# Patient Record
Sex: FEMALE | Race: WHITE | URBAN - METROPOLITAN AREA
[De-identification: names, ages, dates, MRNs, and addresses within clinical notes are randomized per-mention and may not be internally consistent; named-entity substitution may affect disease eponyms.]

---

## 2018-11-01 ENCOUNTER — DOCTOR'S OFFICE (OUTPATIENT)
Dept: URBAN - METROPOLITAN AREA CLINIC 136 | Facility: CLINIC | Age: 73
Setting detail: OPHTHALMOLOGY
End: 2018-11-01
Payer: COMMERCIAL

## 2018-11-01 ENCOUNTER — RX ONLY (RX ONLY)
Age: 73
End: 2018-11-01

## 2018-11-01 DIAGNOSIS — H25.12: ICD-10-CM

## 2018-11-01 DIAGNOSIS — Z96.1: ICD-10-CM

## 2018-11-01 DIAGNOSIS — H59.031: ICD-10-CM

## 2018-11-01 DIAGNOSIS — H35.379: ICD-10-CM

## 2018-11-01 PROCEDURE — 92004 COMPRE OPH EXAM NEW PT 1/>: CPT | Performed by: OPHTHALMOLOGY

## 2018-11-01 PROCEDURE — 92134 CPTRZ OPH DX IMG PST SGM RTA: CPT | Performed by: OPHTHALMOLOGY

## 2018-11-01 ASSESSMENT — REFRACTION_MANIFEST
OD_VA2: 20/
OS_VA1: 20/
OD_VA1: 20/
OU_VA: 20/
OD_VA1: 20/
OS_VA2: 20/
OD_VA3: 20/
OS_VA2: 20/
OD_VA3: 20/
OS_VA1: 20/
OU_VA: 20/
OS_VA3: 20/
OS_VA3: 20/
OD_VA2: 20/

## 2018-11-01 ASSESSMENT — VISUAL ACUITY
OD_BCVA: 20/20-2
OS_BCVA: 20/200

## 2018-11-01 ASSESSMENT — REFRACTION_CURRENTRX
OS_OVR_VA: 20/
OD_ADD: +2.25
OD_OVR_VA: 20/
OS_ADD: +2.25
OD_OVR_VA: 20/
OD_SPHERE: +1.75
OD_CYLINDER: -1.50
OD_OVR_VA: 20/
OS_OVR_VA: 20/
OS_OVR_VA: 20/
OS_CYLINDER: -1.75
OS_SPHERE: +2.25
OS_AXIS: 173
OD_AXIS: 152

## 2018-11-01 ASSESSMENT — CONFRONTATIONAL VISUAL FIELD TEST (CVF)
OD_FINDINGS: FULL
OS_FINDINGS: FULL

## 2018-11-01 ASSESSMENT — SUPERFICIAL PUNCTATE KERATITIS (SPK)
OD_SPK: 1+
OS_SPK: 1+

## 2018-11-07 ENCOUNTER — DOCTOR'S OFFICE (OUTPATIENT)
Dept: URBAN - METROPOLITAN AREA CLINIC 136 | Facility: CLINIC | Age: 73
Setting detail: OPHTHALMOLOGY
End: 2018-11-07
Payer: COMMERCIAL

## 2018-11-07 DIAGNOSIS — H59.031: ICD-10-CM

## 2018-11-07 PROCEDURE — 67515 INJECT/TREAT EYE SOCKET: CPT | Performed by: OPHTHALMOLOGY

## 2018-11-07 ASSESSMENT — CONFRONTATIONAL VISUAL FIELD TEST (CVF)
OD_FINDINGS: FULL
OS_FINDINGS: FULL

## 2018-11-15 ENCOUNTER — RX ONLY (RX ONLY)
Age: 73
End: 2018-11-15

## 2018-11-15 ASSESSMENT — REFRACTION_CURRENTRX
OS_ADD: +2.25
OD_OVR_VA: 20/
OD_OVR_VA: 20/
OS_OVR_VA: 20/
OS_SPHERE: +2.25
OS_OVR_VA: 20/
OD_OVR_VA: 20/
OS_OVR_VA: 20/
OD_AXIS: 152
OD_SPHERE: +1.75
OD_CYLINDER: -1.50
OS_AXIS: 173
OD_ADD: +2.25
OS_CYLINDER: -1.75

## 2018-11-15 ASSESSMENT — REFRACTION_MANIFEST
OS_VA1: 20/
OD_VA3: 20/
OS_VA3: 20/
OD_VA1: 20/
OU_VA: 20/
OS_VA2: 20/
OS_VA1: 20/
OD_VA1: 20/
OS_VA3: 20/
OD_VA2: 20/
OU_VA: 20/
OD_VA2: 20/
OS_VA2: 20/
OD_VA3: 20/

## 2018-11-15 ASSESSMENT — VISUAL ACUITY
OD_BCVA: 20/25
OS_BCVA: 20/60-1

## 2018-11-21 ENCOUNTER — DOCTOR'S OFFICE (OUTPATIENT)
Dept: URBAN - METROPOLITAN AREA CLINIC 136 | Facility: CLINIC | Age: 73
Setting detail: OPHTHALMOLOGY
End: 2018-11-21
Payer: COMMERCIAL

## 2018-11-21 DIAGNOSIS — H59.031: ICD-10-CM

## 2018-11-21 PROCEDURE — 92134 CPTRZ OPH DX IMG PST SGM RTA: CPT | Performed by: OPHTHALMOLOGY

## 2018-11-21 PROCEDURE — 92012 INTRM OPH EXAM EST PATIENT: CPT | Performed by: OPHTHALMOLOGY

## 2018-11-21 ASSESSMENT — CONFRONTATIONAL VISUAL FIELD TEST (CVF)
OD_FINDINGS: FULL
OS_FINDINGS: FULL

## 2018-11-21 ASSESSMENT — SUPERFICIAL PUNCTATE KERATITIS (SPK)
OS_SPK: 1+
OD_SPK: 1+

## 2018-11-25 ASSESSMENT — REFRACTION_CURRENTRX
OS_OVR_VA: 20/
OS_SPHERE: +2.25
OS_OVR_VA: 20/
OS_CYLINDER: -1.75
OS_AXIS: 173
OS_OVR_VA: 20/
OS_ADD: +2.25
OD_OVR_VA: 20/
OD_ADD: +2.25
OD_AXIS: 152
OD_CYLINDER: -1.50
OD_SPHERE: +1.75
OD_OVR_VA: 20/
OD_OVR_VA: 20/

## 2018-11-25 ASSESSMENT — REFRACTION_MANIFEST
OS_VA3: 20/
OD_VA2: 20/
OU_VA: 20/
OD_VA1: 20/
OD_VA3: 20/
OS_VA2: 20/
OU_VA: 20/
OD_VA1: 20/
OD_VA2: 20/
OS_VA2: 20/
OS_VA3: 20/
OS_VA1: 20/
OD_VA3: 20/
OS_VA1: 20/

## 2018-11-25 ASSESSMENT — VISUAL ACUITY
OS_BCVA: 20/60+1
OD_BCVA: 20/25

## 2018-12-11 ENCOUNTER — RX ONLY (RX ONLY)
Age: 73
End: 2018-12-11

## 2018-12-11 ENCOUNTER — DOCTOR'S OFFICE (OUTPATIENT)
Dept: URBAN - METROPOLITAN AREA CLINIC 136 | Facility: CLINIC | Age: 73
Setting detail: OPHTHALMOLOGY
End: 2018-12-11
Payer: COMMERCIAL

## 2018-12-11 DIAGNOSIS — H25.12: ICD-10-CM

## 2018-12-11 DIAGNOSIS — H59.031: ICD-10-CM

## 2018-12-11 DIAGNOSIS — H35.379: ICD-10-CM

## 2018-12-11 DIAGNOSIS — Z96.1: ICD-10-CM

## 2018-12-11 PROCEDURE — 67028 INJECTION EYE DRUG: CPT | Performed by: OPHTHALMOLOGY

## 2018-12-11 PROCEDURE — 92134 CPTRZ OPH DX IMG PST SGM RTA: CPT | Performed by: OPHTHALMOLOGY

## 2018-12-11 PROCEDURE — 92014 COMPRE OPH EXAM EST PT 1/>: CPT | Performed by: OPHTHALMOLOGY

## 2018-12-11 ASSESSMENT — REFRACTION_CURRENTRX
OS_OVR_VA: 20/
OS_ADD: +2.25
OS_OVR_VA: 20/
OD_OVR_VA: 20/
OD_AXIS: 152
OS_OVR_VA: 20/
OD_OVR_VA: 20/
OD_OVR_VA: 20/
OS_SPHERE: +2.25
OD_ADD: +2.25
OD_CYLINDER: -1.50
OD_SPHERE: +1.75
OS_AXIS: 173
OS_CYLINDER: -1.75

## 2018-12-11 ASSESSMENT — SUPERFICIAL PUNCTATE KERATITIS (SPK)
OS_SPK: 1+
OD_SPK: 1+

## 2018-12-11 ASSESSMENT — REFRACTION_MANIFEST
OU_VA: 20/
OS_VA3: 20/
OS_VA1: 20/
OS_VA1: 20/
OS_VA2: 20/
OU_VA: 20/
OS_VA2: 20/
OD_VA1: 20/
OD_VA3: 20/
OS_VA3: 20/
OD_VA3: 20/
OD_VA2: 20/
OD_VA2: 20/
OD_VA1: 20/

## 2018-12-11 ASSESSMENT — VISUAL ACUITY
OS_BCVA: 20/60-2
OD_BCVA: 20/40-

## 2018-12-11 ASSESSMENT — CONFRONTATIONAL VISUAL FIELD TEST (CVF)
OS_FINDINGS: FULL
OD_FINDINGS: FULL

## 2019-01-22 ENCOUNTER — DOCTOR'S OFFICE (OUTPATIENT)
Dept: URBAN - METROPOLITAN AREA CLINIC 136 | Facility: CLINIC | Age: 74
Setting detail: OPHTHALMOLOGY
End: 2019-01-22
Payer: COMMERCIAL

## 2019-01-22 DIAGNOSIS — H59.031: ICD-10-CM

## 2019-01-22 DIAGNOSIS — H25.12: ICD-10-CM

## 2019-01-22 DIAGNOSIS — H35.373: ICD-10-CM

## 2019-01-22 DIAGNOSIS — Z96.1: ICD-10-CM

## 2019-01-22 PROCEDURE — 92134 CPTRZ OPH DX IMG PST SGM RTA: CPT | Performed by: OPHTHALMOLOGY

## 2019-01-22 PROCEDURE — 92012 INTRM OPH EXAM EST PATIENT: CPT | Performed by: OPHTHALMOLOGY

## 2019-01-22 ASSESSMENT — CONFRONTATIONAL VISUAL FIELD TEST (CVF)
OD_FINDINGS: FULL
OS_FINDINGS: FULL

## 2019-01-22 ASSESSMENT — SUPERFICIAL PUNCTATE KERATITIS (SPK)
OD_SPK: 1+ 2+
OS_SPK: 1+

## 2019-01-23 ASSESSMENT — REFRACTION_CURRENTRX
OS_OVR_VA: 20/
OS_SPHERE: +2.25
OD_AXIS: 152
OD_OVR_VA: 20/
OS_ADD: +2.25
OS_CYLINDER: -1.75
OD_OVR_VA: 20/
OD_CYLINDER: -1.50
OS_OVR_VA: 20/
OD_SPHERE: +1.75
OS_AXIS: 173
OS_OVR_VA: 20/
OD_OVR_VA: 20/
OD_ADD: +2.25

## 2019-01-23 ASSESSMENT — REFRACTION_MANIFEST
OD_VA1: 20/
OD_VA3: 20/
OD_VA2: 20/
OU_VA: 20/
OD_VA3: 20/
OD_VA1: 20/
OS_VA3: 20/
OS_VA1: 20/
OS_VA3: 20/
OS_VA1: 20/
OS_VA2: 20/
OS_VA2: 20/
OU_VA: 20/
OD_VA2: 20/

## 2019-01-23 ASSESSMENT — VISUAL ACUITY
OD_BCVA: 20/30+2
OS_BCVA: 20/50+1

## 2019-03-04 ENCOUNTER — DOCTOR'S OFFICE (OUTPATIENT)
Dept: URBAN - METROPOLITAN AREA CLINIC 136 | Facility: CLINIC | Age: 74
Setting detail: OPHTHALMOLOGY
End: 2019-03-04
Payer: COMMERCIAL

## 2019-03-04 DIAGNOSIS — H35.379: ICD-10-CM

## 2019-03-04 DIAGNOSIS — H59.031: ICD-10-CM

## 2019-03-04 DIAGNOSIS — H25.12: ICD-10-CM

## 2019-03-04 DIAGNOSIS — Z96.1: ICD-10-CM

## 2019-03-04 PROCEDURE — 92134 CPTRZ OPH DX IMG PST SGM RTA: CPT | Performed by: OPHTHALMOLOGY

## 2019-03-04 PROCEDURE — 92014 COMPRE OPH EXAM EST PT 1/>: CPT | Performed by: OPHTHALMOLOGY

## 2019-03-04 ASSESSMENT — REFRACTION_MANIFEST
OD_VA2: 20/
OS_VA1: 20/
OS_VA2: 20/
OD_VA2: 20/
OD_VA1: 20/
OD_VA3: 20/
OS_VA2: 20/
OS_VA1: 20/
OS_VA3: 20/
OS_VA3: 20/
OU_VA: 20/
OD_VA3: 20/
OU_VA: 20/
OD_VA1: 20/

## 2019-03-04 ASSESSMENT — REFRACTION_CURRENTRX
OS_OVR_VA: 20/
OS_OVR_VA: 20/
OD_CYLINDER: -1.50
OD_ADD: +2.25
OS_CYLINDER: -1.75
OD_OVR_VA: 20/
OS_ADD: +2.25
OD_OVR_VA: 20/
OD_SPHERE: +1.75
OS_AXIS: 173
OD_AXIS: 152
OS_SPHERE: +2.25
OD_OVR_VA: 20/
OS_OVR_VA: 20/

## 2019-03-04 ASSESSMENT — VISUAL ACUITY
OS_BCVA: 20/50-2
OD_BCVA: 20/30-2

## 2019-03-04 ASSESSMENT — SUPERFICIAL PUNCTATE KERATITIS (SPK)
OS_SPK: 1+
OD_SPK: 1+ 2+

## 2019-03-04 ASSESSMENT — CONFRONTATIONAL VISUAL FIELD TEST (CVF)
OS_FINDINGS: FULL
OD_FINDINGS: FULL

## 2019-04-02 ENCOUNTER — DOCTOR'S OFFICE (OUTPATIENT)
Dept: URBAN - METROPOLITAN AREA CLINIC 136 | Facility: CLINIC | Age: 74
Setting detail: OPHTHALMOLOGY
End: 2019-04-02
Payer: COMMERCIAL

## 2019-04-02 DIAGNOSIS — Z96.1: ICD-10-CM

## 2019-04-02 DIAGNOSIS — H59.031: ICD-10-CM

## 2019-04-02 DIAGNOSIS — H26.40: ICD-10-CM

## 2019-04-02 DIAGNOSIS — H35.379: ICD-10-CM

## 2019-04-02 DIAGNOSIS — H25.012: ICD-10-CM

## 2019-04-02 DIAGNOSIS — H04.122: ICD-10-CM

## 2019-04-02 DIAGNOSIS — H04.121: ICD-10-CM

## 2019-04-02 PROCEDURE — 92014 COMPRE OPH EXAM EST PT 1/>: CPT | Performed by: OPHTHALMOLOGY

## 2019-04-02 ASSESSMENT — REFRACTION_MANIFEST
OS_VA1: 20/
OD_VA1: 20/
OD_VA2: 20/
OS_VA1: 20/
OU_VA: 20/
OS_VA3: 20/
OD_VA3: 20/
OS_VA3: 20/
OD_VA3: 20/
OS_VA2: 20/
OD_VA1: 20/
OU_VA: 20/
OD_VA2: 20/
OS_VA2: 20/

## 2019-04-02 ASSESSMENT — REFRACTION_CURRENTRX
OS_ADD: +2.25
OS_OVR_VA: 20/
OS_OVR_VA: 20/
OD_AXIS: 152
OS_CYLINDER: -1.75
OS_AXIS: 173
OS_OVR_VA: 20/
OD_CYLINDER: -1.50
OD_OVR_VA: 20/
OD_ADD: +2.25
OD_OVR_VA: 20/
OS_SPHERE: +2.25
OD_SPHERE: +1.75
OD_OVR_VA: 20/

## 2019-04-02 ASSESSMENT — CONFRONTATIONAL VISUAL FIELD TEST (CVF)
OS_FINDINGS: FULL
OD_FINDINGS: FULL

## 2019-04-02 ASSESSMENT — SPHEQUIV_DERIVED: OD_SPHEQUIV: 1

## 2019-04-02 ASSESSMENT — REFRACTION_AUTOREFRACTION
OD_CYLINDER: -1.00
OD_SPHERE: +1.50
OD_AXIS: 110

## 2019-04-02 ASSESSMENT — VISUAL ACUITY
OD_BCVA: 20/30-2
OS_BCVA: 20/50-2

## 2019-04-02 ASSESSMENT — SUPERFICIAL PUNCTATE KERATITIS (SPK): OD_SPK: 1+

## 2019-04-09 ENCOUNTER — DOCTOR'S OFFICE (OUTPATIENT)
Dept: URBAN - METROPOLITAN AREA CLINIC 136 | Facility: CLINIC | Age: 74
Setting detail: OPHTHALMOLOGY
End: 2019-04-09
Payer: COMMERCIAL

## 2019-04-09 DIAGNOSIS — Z96.1: ICD-10-CM

## 2019-04-09 DIAGNOSIS — H59.031: ICD-10-CM

## 2019-04-09 DIAGNOSIS — H04.122: ICD-10-CM

## 2019-04-09 DIAGNOSIS — H04.121: ICD-10-CM

## 2019-04-09 DIAGNOSIS — H26.40: ICD-10-CM

## 2019-04-09 DIAGNOSIS — H35.379: ICD-10-CM

## 2019-04-09 DIAGNOSIS — H25.012: ICD-10-CM

## 2019-04-09 PROCEDURE — 92014 COMPRE OPH EXAM EST PT 1/>: CPT | Performed by: OPHTHALMOLOGY

## 2019-04-09 PROCEDURE — 92134 CPTRZ OPH DX IMG PST SGM RTA: CPT | Performed by: OPHTHALMOLOGY

## 2019-04-09 ASSESSMENT — REFRACTION_MANIFEST
OS_VA3: 20/
OS_VA2: 20/
OD_VA1: 20/
OU_VA: 20/
OD_VA2: 20/
OS_VA1: 20/
OS_VA3: 20/
OS_VA1: 20/
OD_VA2: 20/
OD_VA3: 20/
OS_VA2: 20/
OD_VA3: 20/
OU_VA: 20/
OD_VA1: 20/

## 2019-04-09 ASSESSMENT — REFRACTION_CURRENTRX
OS_OVR_VA: 20/
OS_ADD: +2.25
OS_SPHERE: +2.25
OD_OVR_VA: 20/
OD_ADD: +2.25
OD_CYLINDER: -1.50
OS_OVR_VA: 20/
OD_SPHERE: +1.75
OD_OVR_VA: 20/
OS_AXIS: 173
OD_OVR_VA: 20/
OS_OVR_VA: 20/
OS_CYLINDER: -1.75
OD_AXIS: 152

## 2019-04-09 ASSESSMENT — SPHEQUIV_DERIVED: OD_SPHEQUIV: 1

## 2019-04-09 ASSESSMENT — VISUAL ACUITY
OS_BCVA: 20/60-2
OD_BCVA: 20/30-2

## 2019-04-09 ASSESSMENT — SUPERFICIAL PUNCTATE KERATITIS (SPK): OD_SPK: 1+

## 2019-04-09 ASSESSMENT — CONFRONTATIONAL VISUAL FIELD TEST (CVF)
OS_FINDINGS: FULL
OD_FINDINGS: FULL

## 2019-04-09 ASSESSMENT — REFRACTION_AUTOREFRACTION
OD_CYLINDER: -1.00
OD_AXIS: 110
OD_SPHERE: +1.50

## 2019-05-01 ENCOUNTER — DOCTOR'S OFFICE (OUTPATIENT)
Dept: URBAN - METROPOLITAN AREA CLINIC 136 | Facility: CLINIC | Age: 74
Setting detail: OPHTHALMOLOGY
End: 2019-05-01
Payer: COMMERCIAL

## 2019-05-01 ENCOUNTER — RX ONLY (RX ONLY)
Age: 74
End: 2019-05-01

## 2019-05-01 DIAGNOSIS — H35.351: ICD-10-CM

## 2019-05-01 PROCEDURE — 92134 CPTRZ OPH DX IMG PST SGM RTA: CPT | Performed by: OPHTHALMOLOGY

## 2019-05-01 PROCEDURE — 67028 INJECTION EYE DRUG: CPT | Performed by: OPHTHALMOLOGY

## 2019-05-01 ASSESSMENT — REFRACTION_CURRENTRX
OD_AXIS: 152
OD_OVR_VA: 20/
OS_OVR_VA: 20/
OS_OVR_VA: 20/
OS_AXIS: 173
OS_OVR_VA: 20/
OD_SPHERE: +1.75
OD_OVR_VA: 20/
OD_ADD: +2.25
OS_SPHERE: +2.25
OS_ADD: +2.25
OD_OVR_VA: 20/
OD_CYLINDER: -1.50
OS_CYLINDER: -1.75

## 2019-05-01 ASSESSMENT — REFRACTION_MANIFEST
OD_VA3: 20/
OD_VA1: 20/
OU_VA: 20/
OS_VA3: 20/
OD_VA3: 20/
OD_VA2: 20/
OU_VA: 20/
OS_VA1: 20/
OD_VA2: 20/
OS_VA3: 20/
OD_VA1: 20/
OS_VA2: 20/
OS_VA2: 20/
OS_VA1: 20/

## 2019-05-01 ASSESSMENT — REFRACTION_AUTOREFRACTION
OD_CYLINDER: -1.00
OD_SPHERE: +1.50
OD_AXIS: 110

## 2019-05-01 ASSESSMENT — CONFRONTATIONAL VISUAL FIELD TEST (CVF)
OD_FINDINGS: FULL
OS_FINDINGS: FULL

## 2019-05-01 ASSESSMENT — VISUAL ACUITY
OS_BCVA: 20/60-2
OD_BCVA: 20/30-2

## 2019-05-01 ASSESSMENT — SPHEQUIV_DERIVED: OD_SPHEQUIV: 1

## 2019-05-29 ENCOUNTER — DOCTOR'S OFFICE (OUTPATIENT)
Dept: URBAN - METROPOLITAN AREA CLINIC 136 | Facility: CLINIC | Age: 74
Setting detail: OPHTHALMOLOGY
End: 2019-05-29
Payer: COMMERCIAL

## 2019-05-29 ENCOUNTER — RX ONLY (RX ONLY)
Age: 74
End: 2019-05-29

## 2019-05-29 DIAGNOSIS — H35.351: ICD-10-CM

## 2019-05-29 DIAGNOSIS — H25.012: ICD-10-CM

## 2019-05-29 PROCEDURE — 92014 COMPRE OPH EXAM EST PT 1/>: CPT | Performed by: OPHTHALMOLOGY

## 2019-05-29 PROCEDURE — 92134 CPTRZ OPH DX IMG PST SGM RTA: CPT | Performed by: OPHTHALMOLOGY

## 2019-05-29 ASSESSMENT — REFRACTION_MANIFEST
OU_VA: 20/
OS_VA1: 20/
OD_VA1: 20/
OD_VA2: 20/
OS_VA1: 20/
OU_VA: 20/
OS_VA2: 20/
OS_VA3: 20/
OD_VA3: 20/
OD_VA1: 20/
OD_VA2: 20/
OS_VA3: 20/
OD_VA3: 20/
OS_VA2: 20/

## 2019-05-29 ASSESSMENT — REFRACTION_CURRENTRX
OS_CYLINDER: -1.75
OS_OVR_VA: 20/
OD_CYLINDER: -1.50
OS_SPHERE: +2.25
OD_OVR_VA: 20/
OD_AXIS: 152
OS_AXIS: 173
OD_ADD: +2.25
OS_OVR_VA: 20/
OD_OVR_VA: 20/
OD_OVR_VA: 20/
OD_SPHERE: +1.75
OS_OVR_VA: 20/
OS_ADD: +2.25

## 2019-05-29 ASSESSMENT — CONFRONTATIONAL VISUAL FIELD TEST (CVF)
OD_FINDINGS: FULL
OS_FINDINGS: FULL

## 2019-05-29 ASSESSMENT — SUPERFICIAL PUNCTATE KERATITIS (SPK)
OS_SPK: 2+
OD_SPK: 1+

## 2019-05-29 ASSESSMENT — VISUAL ACUITY
OD_BCVA: 20/60-1
OS_BCVA: 20/50-1

## 2019-05-29 ASSESSMENT — REFRACTION_AUTOREFRACTION
OD_CYLINDER: -1.00
OD_SPHERE: +1.50
OD_AXIS: 110

## 2019-05-29 ASSESSMENT — SPHEQUIV_DERIVED: OD_SPHEQUIV: 1

## 2019-06-11 ENCOUNTER — DOCTOR'S OFFICE (OUTPATIENT)
Dept: URBAN - METROPOLITAN AREA CLINIC 136 | Facility: CLINIC | Age: 74
Setting detail: OPHTHALMOLOGY
End: 2019-06-11
Payer: COMMERCIAL

## 2019-06-11 DIAGNOSIS — H35.379: ICD-10-CM

## 2019-06-11 DIAGNOSIS — H25.012: ICD-10-CM

## 2019-06-11 DIAGNOSIS — H35.351: ICD-10-CM

## 2019-06-11 PROCEDURE — 92014 COMPRE OPH EXAM EST PT 1/>: CPT | Performed by: OPHTHALMOLOGY

## 2019-06-11 ASSESSMENT — REFRACTION_MANIFEST
OD_VA1: 20/
OD_VA3: 20/
OD_VA1: 20/
OU_VA: 20/
OS_VA3: 20/
OS_VA3: 20/
OD_VA2: 20/
OS_VA2: 20/
OD_VA2: 20/
OS_VA1: 20/
OS_VA2: 20/
OS_VA1: 20/
OD_VA3: 20/
OU_VA: 20/

## 2019-06-11 ASSESSMENT — REFRACTION_CURRENTRX
OD_CYLINDER: -1.50
OD_AXIS: 152
OS_CYLINDER: -1.75
OS_OVR_VA: 20/
OS_AXIS: 173
OD_OVR_VA: 20/
OD_OVR_VA: 20/
OS_SPHERE: +2.25
OD_OVR_VA: 20/
OS_OVR_VA: 20/
OS_ADD: +2.25
OD_ADD: +2.25
OD_SPHERE: +1.75
OS_OVR_VA: 20/

## 2019-06-11 ASSESSMENT — REFRACTION_AUTOREFRACTION
OD_CYLINDER: -1.00
OD_SPHERE: +1.50
OD_AXIS: 110

## 2019-06-11 ASSESSMENT — VISUAL ACUITY
OS_BCVA: 20/30-2
OD_BCVA: 20/40-1

## 2019-06-11 ASSESSMENT — CONFRONTATIONAL VISUAL FIELD TEST (CVF)
OS_FINDINGS: FULL
OD_FINDINGS: FULL

## 2019-06-11 ASSESSMENT — SUPERFICIAL PUNCTATE KERATITIS (SPK)
OS_SPK: 2+
OD_SPK: 1+

## 2019-06-11 ASSESSMENT — SPHEQUIV_DERIVED: OD_SPHEQUIV: 1

## 2019-06-21 ENCOUNTER — DOCTOR'S OFFICE (OUTPATIENT)
Dept: URBAN - METROPOLITAN AREA CLINIC 136 | Facility: CLINIC | Age: 74
Setting detail: OPHTHALMOLOGY
End: 2019-06-21
Payer: COMMERCIAL

## 2019-06-21 DIAGNOSIS — H25.012: ICD-10-CM

## 2019-06-21 PROCEDURE — 92136 OPHTHALMIC BIOMETRY: CPT | Performed by: OPHTHALMOLOGY

## 2019-07-24 ENCOUNTER — RX ONLY (RX ONLY)
Age: 74
End: 2019-07-24

## 2019-07-24 ENCOUNTER — DOCTOR'S OFFICE (OUTPATIENT)
Dept: URBAN - METROPOLITAN AREA CLINIC 136 | Facility: CLINIC | Age: 74
Setting detail: OPHTHALMOLOGY
End: 2019-07-24
Payer: COMMERCIAL

## 2019-07-24 DIAGNOSIS — H25.012: ICD-10-CM

## 2019-07-24 DIAGNOSIS — H35.351: ICD-10-CM

## 2019-07-24 DIAGNOSIS — H35.379: ICD-10-CM

## 2019-07-24 PROCEDURE — 92134 CPTRZ OPH DX IMG PST SGM RTA: CPT | Performed by: OPHTHALMOLOGY

## 2019-07-24 PROCEDURE — 92014 COMPRE OPH EXAM EST PT 1/>: CPT | Performed by: OPHTHALMOLOGY

## 2019-07-24 ASSESSMENT — REFRACTION_CURRENTRX
OS_OVR_VA: 20/
OS_OVR_VA: 20/
OD_OVR_VA: 20/
OD_OVR_VA: 20/
OS_SPHERE: +2.25
OS_ADD: +2.25
OD_SPHERE: +1.75
OD_OVR_VA: 20/
OD_ADD: +2.25
OS_OVR_VA: 20/
OD_CYLINDER: -1.50
OS_CYLINDER: -1.75
OS_AXIS: 173
OD_AXIS: 152

## 2019-07-24 ASSESSMENT — VISUAL ACUITY
OS_BCVA: 20/25-1
OD_BCVA: 20/25+2

## 2019-07-24 ASSESSMENT — REFRACTION_MANIFEST
OS_VA2: 20/
OS_VA3: 20/
OD_VA3: 20/
OD_VA1: 20/
OS_VA1: 20/
OS_VA3: 20/
OD_VA2: 20/
OU_VA: 20/
OD_VA3: 20/
OS_VA2: 20/
OU_VA: 20/
OS_VA1: 20/
OD_VA1: 20/
OD_VA2: 20/

## 2019-07-24 ASSESSMENT — SUPERFICIAL PUNCTATE KERATITIS (SPK)
OD_SPK: 1+
OS_SPK: 2+

## 2019-07-24 ASSESSMENT — SPHEQUIV_DERIVED: OD_SPHEQUIV: 1

## 2019-07-24 ASSESSMENT — REFRACTION_AUTOREFRACTION
OD_AXIS: 110
OD_SPHERE: +1.50
OD_CYLINDER: -1.00

## 2019-07-24 ASSESSMENT — CONFRONTATIONAL VISUAL FIELD TEST (CVF)
OD_FINDINGS: FULL
OS_FINDINGS: FULL

## 2019-08-02 ENCOUNTER — AMBUL SURGICAL CARE (OUTPATIENT)
Dept: URBAN - METROPOLITAN AREA SURGERY 32 | Facility: SURGERY | Age: 74
Setting detail: OPHTHALMOLOGY
End: 2019-08-02

## 2019-08-02 DIAGNOSIS — H25.13: ICD-10-CM

## 2019-08-02 PROCEDURE — RESTOR TOR PREMIUM UPGRADE: Performed by: OPHTHALMOLOGY

## 2019-08-13 ENCOUNTER — AMBUL SURGICAL CARE (OUTPATIENT)
Dept: URBAN - METROPOLITAN AREA SURGERY 32 | Facility: SURGERY | Age: 74
Setting detail: OPHTHALMOLOGY
End: 2019-08-13
Payer: COMMERCIAL

## 2019-08-13 DIAGNOSIS — H25.012: ICD-10-CM

## 2019-08-13 DIAGNOSIS — H25.12: ICD-10-CM

## 2019-08-13 PROCEDURE — V2788 PRESBYOPIA-CORRECT FUNCTION: HCPCS | Performed by: OPHTHALMOLOGY

## 2019-08-13 PROCEDURE — G8907 PT DOC NO EVENTS ON DISCHARG: HCPCS | Performed by: OPHTHALMOLOGY

## 2019-08-13 PROCEDURE — 66984 XCAPSL CTRC RMVL W/O ECP: CPT | Performed by: OPHTHALMOLOGY

## 2019-08-13 PROCEDURE — G8918 PT W/O PREOP ORDER IV AB PRO: HCPCS | Performed by: OPHTHALMOLOGY

## 2019-08-14 ENCOUNTER — DOCTOR'S OFFICE (OUTPATIENT)
Dept: URBAN - METROPOLITAN AREA CLINIC 136 | Facility: CLINIC | Age: 74
Setting detail: OPHTHALMOLOGY
End: 2019-08-14

## 2019-08-14 ENCOUNTER — RX ONLY (RX ONLY)
Age: 74
End: 2019-08-14

## 2019-08-14 DIAGNOSIS — Z96.1: ICD-10-CM

## 2019-08-14 PROCEDURE — 99024 POSTOP FOLLOW-UP VISIT: CPT | Performed by: OPTOMETRIST

## 2019-08-14 ASSESSMENT — SPHEQUIV_DERIVED
OD_SPHEQUIV: 1
OS_SPHEQUIV: 1.25

## 2019-08-14 ASSESSMENT — REFRACTION_AUTOREFRACTION
OS_CYLINDER: -1.00
OS_SPHERE: +1.75
OD_AXIS: 110
OS_AXIS: 145
OD_CYLINDER: -1.00
OD_SPHERE: +1.50

## 2019-08-14 ASSESSMENT — REFRACTION_MANIFEST
OU_VA: 20/
OS_VA3: 20/
OS_SPHERE: +1.00
OS_VA3: 20/
OS_VA1: 20/30-2
OD_VA3: 20/
OD_VA1: 20/
OS_VA2: 20/
OS_VA1: 20/
OS_VA2: 20/30-2
OS_CYLINDER: SPH
OD_VA1: 20/
OD_VA3: 20/
OU_VA: 20/
OS_ADD: +1.50
OD_VA2: 20/
OD_VA2: 20/

## 2019-08-14 ASSESSMENT — REFRACTION_CURRENTRX
OS_AXIS: 173
OD_CYLINDER: -1.50
OD_OVR_VA: 20/
OS_SPHERE: +2.25
OS_CYLINDER: -1.75
OS_ADD: +2.25
OS_OVR_VA: 20/
OS_OVR_VA: 20/
OD_ADD: +2.25
OS_OVR_VA: 20/
OD_OVR_VA: 20/
OD_OVR_VA: 20/
OD_AXIS: 152
OD_SPHERE: +1.75

## 2019-08-14 ASSESSMENT — VISUAL ACUITY
OS_BCVA: 20/25-1
OD_BCVA: 20/60-1

## 2019-08-14 ASSESSMENT — SUPERFICIAL PUNCTATE KERATITIS (SPK)
OS_SPK: 2+
OD_SPK: 1+

## 2019-08-27 ENCOUNTER — DOCTOR'S OFFICE (OUTPATIENT)
Dept: URBAN - METROPOLITAN AREA CLINIC 136 | Facility: CLINIC | Age: 74
Setting detail: OPHTHALMOLOGY
End: 2019-08-27

## 2019-08-27 DIAGNOSIS — Z96.1: ICD-10-CM

## 2019-08-27 PROBLEM — H25.012 CATARACT; LEFT EYE: Status: RESOLVED | Noted: 2019-04-02 | Resolved: 2019-08-27

## 2019-08-27 PROCEDURE — 99024 POSTOP FOLLOW-UP VISIT: CPT | Performed by: OPHTHALMOLOGY

## 2019-08-27 ASSESSMENT — REFRACTION_MANIFEST
OS_VA3: 20/
OS_CYLINDER: SPH
OS_VA2: 20/
OS_VA1: 20/
OS_VA3: 20/
OD_VA1: 20/
OU_VA: 20/
OD_VA3: 20/
OD_VA1: 20/
OS_VA2: 20/30-2
OD_VA3: 20/
OD_VA2: 20/
OS_ADD: +1.50
OS_SPHERE: +1.00
OS_VA1: 20/30-2
OU_VA: 20/
OD_VA2: 20/

## 2019-08-27 ASSESSMENT — REFRACTION_CURRENTRX
OS_OVR_VA: 20/
OD_OVR_VA: 20/
OD_OVR_VA: 20/
OD_CYLINDER: -1.50
OS_CYLINDER: -1.75
OD_AXIS: 152
OS_OVR_VA: 20/
OS_OVR_VA: 20/
OD_SPHERE: +1.75
OS_ADD: +2.25
OS_AXIS: 173
OS_SPHERE: +2.25
OD_ADD: +2.25
OD_OVR_VA: 20/

## 2019-08-27 ASSESSMENT — SPHEQUIV_DERIVED
OD_SPHEQUIV: 1
OS_SPHEQUIV: 1

## 2019-08-27 ASSESSMENT — REFRACTION_AUTOREFRACTION
OS_SPHERE: +1.25
OD_CYLINDER: -1.00
OD_SPHERE: +1.50
OD_AXIS: 110
OS_CYLINDER: -0.50
OS_AXIS: 096

## 2019-08-27 ASSESSMENT — SUPERFICIAL PUNCTATE KERATITIS (SPK)
OD_SPK: 1+
OS_SPK: 2+

## 2019-08-27 ASSESSMENT — VISUAL ACUITY
OD_BCVA: 20/40-2
OS_BCVA: 20/25-1

## 2019-09-24 ENCOUNTER — DOCTOR'S OFFICE (OUTPATIENT)
Dept: URBAN - METROPOLITAN AREA CLINIC 136 | Facility: CLINIC | Age: 74
Setting detail: OPHTHALMOLOGY
End: 2019-09-24
Payer: COMMERCIAL

## 2019-09-24 DIAGNOSIS — H59.032: ICD-10-CM

## 2019-09-24 DIAGNOSIS — Z96.1: ICD-10-CM

## 2019-09-24 DIAGNOSIS — H35.81: ICD-10-CM

## 2019-09-24 PROCEDURE — 92014 COMPRE OPH EXAM EST PT 1/>: CPT | Performed by: OPHTHALMOLOGY

## 2019-09-24 PROCEDURE — 92134 CPTRZ OPH DX IMG PST SGM RTA: CPT | Performed by: OPHTHALMOLOGY

## 2019-09-24 ASSESSMENT — REFRACTION_CURRENTRX
OS_OVR_VA: 20/
OD_CYLINDER: -1.50
OS_SPHERE: +2.25
OD_OVR_VA: 20/
OS_OVR_VA: 20/
OD_AXIS: 152
OS_OVR_VA: 20/
OS_CYLINDER: -1.75
OD_OVR_VA: 20/
OS_AXIS: 173
OD_ADD: +2.25
OD_OVR_VA: 20/
OD_SPHERE: +1.75
OS_ADD: +2.25

## 2019-09-24 ASSESSMENT — REFRACTION_MANIFEST
OD_VA1: 20/
OD_VA2: 20/
OD_VA3: 20/
OU_VA: 20/
OD_VA1: 20/
OS_SPHERE: +1.00
OS_CYLINDER: SPH
OS_VA1: 20/30-2
OS_VA3: 20/
OU_VA: 20/
OS_VA3: 20/
OD_VA3: 20/
OS_ADD: +1.50
OD_VA2: 20/
OS_VA1: 20/
OS_VA2: 20/30-2
OS_VA2: 20/

## 2019-09-24 ASSESSMENT — SUPERFICIAL PUNCTATE KERATITIS (SPK)
OS_SPK: 2+
OD_SPK: 1+

## 2019-09-24 ASSESSMENT — REFRACTION_AUTOREFRACTION
OS_SPHERE: +1.25
OS_CYLINDER: -0.50
OD_SPHERE: +1.50
OD_AXIS: 110
OD_CYLINDER: -1.00
OS_AXIS: 096

## 2019-09-24 ASSESSMENT — SPHEQUIV_DERIVED
OD_SPHEQUIV: 1
OS_SPHEQUIV: 1

## 2019-09-24 ASSESSMENT — CONFRONTATIONAL VISUAL FIELD TEST (CVF)
OD_FINDINGS: FULL
OS_FINDINGS: FULL

## 2019-09-24 ASSESSMENT — VISUAL ACUITY
OD_BCVA: 20/50-2
OS_BCVA: 20/25-2

## 2019-11-12 ENCOUNTER — RX ONLY (RX ONLY)
Age: 74
End: 2019-11-12

## 2019-11-12 ENCOUNTER — DOCTOR'S OFFICE (OUTPATIENT)
Dept: URBAN - METROPOLITAN AREA CLINIC 136 | Facility: CLINIC | Age: 74
Setting detail: OPHTHALMOLOGY
End: 2019-11-12
Payer: COMMERCIAL

## 2019-11-12 DIAGNOSIS — H59.032: ICD-10-CM

## 2019-11-12 DIAGNOSIS — Z96.1: ICD-10-CM

## 2019-11-12 PROCEDURE — 92012 INTRM OPH EXAM EST PATIENT: CPT | Performed by: OPHTHALMOLOGY

## 2019-11-12 PROCEDURE — 92134 CPTRZ OPH DX IMG PST SGM RTA: CPT | Performed by: OPHTHALMOLOGY

## 2019-11-12 ASSESSMENT — REFRACTION_MANIFEST
OD_VA1: 20/
OS_VA2: 20/30-2
OS_VA1: 20/
OD_VA3: 20/
OS_VA2: 20/
OD_VA2: 20/
OS_VA3: 20/
OD_VA1: 20/
OU_VA: 20/
OD_VA3: 20/
OS_SPHERE: +1.00
OU_VA: 20/
OD_VA2: 20/
OS_VA1: 20/30-2
OS_VA3: 20/
OS_ADD: +1.50
OS_CYLINDER: SPH

## 2019-11-12 ASSESSMENT — REFRACTION_AUTOREFRACTION
OD_SPHERE: +1.50
OS_SPHERE: +1.25
OD_CYLINDER: -1.00
OD_AXIS: 110
OS_CYLINDER: -0.50
OS_AXIS: 096

## 2019-11-12 ASSESSMENT — CONFRONTATIONAL VISUAL FIELD TEST (CVF)
OD_FINDINGS: FULL
OS_FINDINGS: FULL

## 2019-11-12 ASSESSMENT — VISUAL ACUITY
OS_BCVA: 20/25-2
OD_BCVA: 20/25-2

## 2019-11-12 ASSESSMENT — SPHEQUIV_DERIVED
OD_SPHEQUIV: 1
OS_SPHEQUIV: 1

## 2019-11-12 ASSESSMENT — REFRACTION_CURRENTRX
OD_OVR_VA: 20/
OS_OVR_VA: 20/
OS_CYLINDER: -1.75
OD_SPHERE: +1.75
OD_OVR_VA: 20/
OD_ADD: +2.25
OS_OVR_VA: 20/
OD_OVR_VA: 20/
OS_SPHERE: +2.25
OD_CYLINDER: -1.50
OS_OVR_VA: 20/
OS_ADD: +2.25
OD_AXIS: 152
OS_AXIS: 173

## 2019-11-12 ASSESSMENT — SUPERFICIAL PUNCTATE KERATITIS (SPK)
OD_SPK: 1+
OS_SPK: 2+

## 2019-11-26 ENCOUNTER — DOCTOR'S OFFICE (OUTPATIENT)
Dept: URBAN - METROPOLITAN AREA CLINIC 136 | Facility: CLINIC | Age: 74
Setting detail: OPHTHALMOLOGY
End: 2019-11-26
Payer: COMMERCIAL

## 2019-11-26 DIAGNOSIS — Z96.1: ICD-10-CM

## 2019-11-26 DIAGNOSIS — H59.032: ICD-10-CM

## 2019-11-26 PROBLEM — H04.122 DRY EYE; RIGHT EYE, LEFT EYE: Status: ACTIVE | Noted: 2019-04-02

## 2019-11-26 PROBLEM — H35.81 MACULAR EDEMA ; LEFT EYE: Status: ACTIVE | Noted: 2019-04-02

## 2019-11-26 PROBLEM — H26.40 PCO ; RIGHT EYE: Status: ACTIVE | Noted: 2019-04-02

## 2019-11-26 PROBLEM — H35.351 CYSTOID MACULAR EDEMA; RIGHT EYE: Status: ACTIVE | Noted: 2019-05-01

## 2019-11-26 PROBLEM — H04.121 DRY EYE; RIGHT EYE, LEFT EYE: Status: ACTIVE | Noted: 2019-04-02

## 2019-11-26 PROBLEM — H35.379 EPIRETINAL MEMBRANE ; LEFT EYE: Status: ACTIVE | Noted: 2018-11-01

## 2019-11-26 PROCEDURE — 92012 INTRM OPH EXAM EST PATIENT: CPT | Performed by: OPHTHALMOLOGY

## 2019-11-26 ASSESSMENT — REFRACTION_MANIFEST
OD_VA1: 20/
OS_VA2: 20/
OS_SPHERE: +1.00
OD_VA2: 20/
OS_VA3: 20/
OS_VA2: 20/30-2
OS_VA1: 20/
OU_VA: 20/
OU_VA: 20/
OS_CYLINDER: SPH
OD_VA1: 20/
OS_ADD: +1.50
OD_VA2: 20/
OS_VA1: 20/30-2
OD_VA3: 20/
OD_VA3: 20/
OS_VA3: 20/

## 2019-11-26 ASSESSMENT — REFRACTION_AUTOREFRACTION
OD_SPHERE: +1.50
OD_CYLINDER: -1.00
OS_SPHERE: +1.25
OS_AXIS: 096
OS_CYLINDER: -0.50
OD_AXIS: 110

## 2019-11-26 ASSESSMENT — REFRACTION_CURRENTRX
OS_OVR_VA: 20/
OS_AXIS: 173
OD_SPHERE: +1.75
OS_ADD: +2.25
OD_OVR_VA: 20/
OD_OVR_VA: 20/
OD_AXIS: 152
OS_SPHERE: +2.25
OD_OVR_VA: 20/
OS_CYLINDER: -1.75
OS_OVR_VA: 20/
OD_ADD: +2.25
OS_OVR_VA: 20/
OD_CYLINDER: -1.50

## 2019-11-26 ASSESSMENT — SPHEQUIV_DERIVED
OS_SPHEQUIV: 1
OD_SPHEQUIV: 1

## 2019-11-26 ASSESSMENT — CONFRONTATIONAL VISUAL FIELD TEST (CVF)
OS_FINDINGS: FULL
OD_FINDINGS: FULL

## 2019-11-26 ASSESSMENT — VISUAL ACUITY
OS_BCVA: 20/40-2
OD_BCVA: 20/40

## 2019-11-26 ASSESSMENT — SUPERFICIAL PUNCTATE KERATITIS (SPK)
OS_SPK: 2+
OD_SPK: 1+

## 2025-06-14 ENCOUNTER — APPOINTMENT (EMERGENCY)
Dept: CT IMAGING | Facility: HOSPITAL | Age: 80
End: 2025-06-14
Payer: COMMERCIAL

## 2025-06-14 ENCOUNTER — APPOINTMENT (EMERGENCY)
Dept: RADIOLOGY | Facility: HOSPITAL | Age: 80
End: 2025-06-14
Payer: COMMERCIAL

## 2025-06-14 ENCOUNTER — HOSPITAL ENCOUNTER (EMERGENCY)
Facility: HOSPITAL | Age: 80
Discharge: HOME | End: 2025-06-14
Attending: EMERGENCY MEDICINE | Admitting: EMERGENCY MEDICINE
Payer: COMMERCIAL

## 2025-06-14 VITALS
RESPIRATION RATE: 25 BRPM | OXYGEN SATURATION: 96 % | HEART RATE: 74 BPM | SYSTOLIC BLOOD PRESSURE: 142 MMHG | TEMPERATURE: 97.5 F | WEIGHT: 183.42 LBS | DIASTOLIC BLOOD PRESSURE: 95 MMHG

## 2025-06-14 DIAGNOSIS — S09.90XA INJURY OF HEAD, INITIAL ENCOUNTER: ICD-10-CM
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
## 2025-06-14 LAB
ALBUMIN SERPL BCG-MCNC: 4.1 G/DL (ref 3.5–5)
ALP SERPL-CCNC: 56 U/L (ref 34–104)
ALT SERPL W P-5'-P-CCNC: 33 U/L (ref 7–52)
ANION GAP SERPL CALCULATED.3IONS-SCNC: 7 MMOL/L (ref 4–13)
APTT PPP: 30 SECONDS (ref 23–34)
AST SERPL W P-5'-P-CCNC: 25 U/L (ref 13–39)
BASOPHILS # BLD AUTO: 0.03 THOUSANDS/ÂΜL (ref 0–0.1)
BASOPHILS NFR BLD AUTO: 1 % (ref 0–1)
BILIRUB SERPL-MCNC: 0.54 MG/DL (ref 0.2–1)
BUN SERPL-MCNC: 26 MG/DL (ref 5–25)
CALCIUM SERPL-MCNC: 9.4 MG/DL (ref 8.4–10.2)
CHLORIDE SERPL-SCNC: 107 MMOL/L (ref 96–108)
CO2 SERPL-SCNC: 26 MMOL/L (ref 21–32)
CREAT SERPL-MCNC: 0.81 MG/DL (ref 0.6–1.3)
EOSINOPHIL # BLD AUTO: 0.09 THOUSAND/ÂΜL (ref 0–0.61)
EOSINOPHIL NFR BLD AUTO: 2 % (ref 0–6)
ERYTHROCYTE [DISTWIDTH] IN BLOOD BY AUTOMATED COUNT: 14.6 % (ref 11.6–15.1)
GFR SERPL CREATININE-BSD FRML MDRD: 68 ML/MIN/1.73SQ M
GLUCOSE SERPL-MCNC: 99 MG/DL (ref 65–140)
HCT VFR BLD AUTO: 41.6 % (ref 34.8–46.1)
HGB BLD-MCNC: 13.5 G/DL (ref 11.5–15.4)
IMM GRANULOCYTES # BLD AUTO: 0.01 THOUSAND/UL (ref 0–0.2)
IMM GRANULOCYTES NFR BLD AUTO: 0 % (ref 0–2)
INR PPP: 1 (ref 0.85–1.19)
LYMPHOCYTES # BLD AUTO: 1.26 THOUSANDS/ÂΜL (ref 0.6–4.47)
LYMPHOCYTES NFR BLD AUTO: 28 % (ref 14–44)
MCH RBC QN AUTO: 28.2 PG (ref 26.8–34.3)
MCHC RBC AUTO-ENTMCNC: 32.5 G/DL (ref 31.4–37.4)
MCV RBC AUTO: 87 FL (ref 82–98)
MONOCYTES # BLD AUTO: 0.3 THOUSAND/ÂΜL (ref 0.17–1.22)
MONOCYTES NFR BLD AUTO: 7 % (ref 4–12)
NEUTROPHILS # BLD AUTO: 2.87 THOUSANDS/ÂΜL (ref 1.85–7.62)
NEUTS SEG NFR BLD AUTO: 62 % (ref 43–75)
NRBC BLD AUTO-RTO: 0 /100 WBCS
PLATELET # BLD AUTO: 204 THOUSANDS/UL (ref 149–390)
PMV BLD AUTO: 10.2 FL (ref 8.9–12.7)
POTASSIUM SERPL-SCNC: 3.8 MMOL/L (ref 3.5–5.3)
PROT SERPL-MCNC: 6.6 G/DL (ref 6.4–8.4)
PROTHROMBIN TIME: 13.6 SECONDS (ref 12.3–15)
RBC # BLD AUTO: 4.78 MILLION/UL (ref 3.81–5.12)
SODIUM SERPL-SCNC: 140 MMOL/L (ref 135–147)
WBC # BLD AUTO: 4.56 THOUSAND/UL (ref 4.31–10.16)

## 2025-06-14 PROCEDURE — 72170 X-RAY EXAM OF PELVIS: CPT

## 2025-06-14 PROCEDURE — 70450 CT HEAD/BRAIN W/O DYE: CPT

## 2025-06-14 PROCEDURE — 85025 COMPLETE CBC W/AUTO DIFF WBC: CPT | Performed by: EMERGENCY MEDICINE

## 2025-06-14 PROCEDURE — 99284 EMERGENCY DEPT VISIT MOD MDM: CPT | Performed by: EMERGENCY MEDICINE

## 2025-06-14 PROCEDURE — 85610 PROTHROMBIN TIME: CPT | Performed by: EMERGENCY MEDICINE

## 2025-06-14 PROCEDURE — 36415 COLL VENOUS BLD VENIPUNCTURE: CPT | Performed by: EMERGENCY MEDICINE

## 2025-06-14 PROCEDURE — 80053 COMPREHEN METABOLIC PANEL: CPT | Performed by: EMERGENCY MEDICINE

## 2025-06-14 PROCEDURE — 99284 EMERGENCY DEPT VISIT MOD MDM: CPT

## 2025-06-14 PROCEDURE — 72125 CT NECK SPINE W/O DYE: CPT

## 2025-06-14 PROCEDURE — 85730 THROMBOPLASTIN TIME PARTIAL: CPT | Performed by: EMERGENCY MEDICINE

## 2025-06-14 PROCEDURE — 71045 X-RAY EXAM CHEST 1 VIEW: CPT

## 2025-06-14 NOTE — DISCHARGE INSTRUCTIONS
At this time, the CAT scan of the head and cervical spine did not reveal any significant abnormality.    The x-rays were also otherwise normal.     Continue to use Tylenol as needed for headache.  Plan to follow-up with your doctor as needed.  Restart your Plavix today.

## 2025-06-14 NOTE — ED PROVIDER NOTES
Emergency Department Trauma Note  Allison Salazar 79 y.o. female MRN: [id number]  Unit/Bed#: ED 07/ED 07 Encounter: [id number]      Trauma Alert: Trauma Acuity: Trauma Evaluation  Model of Arrival:   via    Trauma Team: Current Providers  Attending Provider: Meng MCNULTY MD  Registered Nurse: Alize Stanton RN  Consultants: None.       History of Present Illness     Chief Complaint:   Chief Complaint   Patient presents with    Fall     Pt states she has drop foot from previous stroke. States tripped over carpet and fell forward hitting head and broke glasses. On plavix. Denies loc     HPI:  Allison Salazar is a 79 y.o. female who presents with headache after a fall yesterday.  Mechanism:Details of Incident: trip and fall on carpet hitting head Injury Date: 06/13/25 Injury Time: 1600      Patient is an 79-year-old female with history of CVA (on Plavix), hypertension and hyperlipidemia presenting to the ER with right-sided headache, mostly behind the right eye after a fall yesterday on carpet.  Patient states she has residual left-sided foot drop and this caused her to trip over the carpet.  She fell striking her forehead.  Her glasses did break in the process.  No open wounds noted and patient denied any pain initially.  This morning when she woke she started having right-sided headache, mostly behind the right eye.  No vision complaints.  Patient also reports mild right neck pain and her intermittent right hip pain has been causing her more pain today.  Patient has been able to walk.  She walked into the ER today and was brought in by her son.  No numbness or weakness noted.      Fall    Review of Systems    Historical Information     Immunizations:   Immunization History   Administered Date(s) Administered    COVID-19 MODERNA VACC 0.5 ML IM 01/07/2021, 02/15/2021, 08/27/2021, 04/08/2022    COVID-19 Moderna Vac BIVALENT 12 Yr+ IM 0.5 ML 09/12/2022    COVID-19 Moderna mRNA Vaccine 12 Yr+ 50 mcg/0.5 mL  (Spikevax) 05/21/2025    COVID-19 Pfizer mRNA vacc PF sergey-sucrose 12 yr and older (Comirnaty) 09/27/2023, 08/29/2024       Past Medical History[1]  Family History[2]  Past Surgical History[3]  Social History[4]  E-Cigarette/Vaping     E-Cigarette/Vaping Substances       Family History: non-contributory    Meds/Allergies   None       Allergies[5]    PHYSICAL EXAM    PE limited by: Nothing      Objective   Vitals:   First set: Temperature: 97.5 °F (36.4 °C) (06/14/25 1025)  Pulse: 76 (06/14/25 1025)  Respirations: 18 (06/14/25 1025)  Blood Pressure: 163/92 (06/14/25 1025)  SpO2: 94 % (06/14/25 1025)    Primary Survey:   (A) Airway: Intact   (B) Breathing: Nonlabored;   (C) Circulation: Pulses:   normal  (D) Disabliity:  GCS Total:  15, Eye Opening:   Spontaneous = 4, Motor Response: Obeys commands = 6, and Verbal Response:  Oriented = 5  (E) Expose:  Completed    Secondary Survey: (Click on Physical Exam tab above)  Physical Exam  Vitals and nursing note reviewed.   Constitutional:       General: She is not in acute distress.     Appearance: She is well-developed.      Comments: TTP over midline, C3-5. No crepitus.    HENT:      Head: Normocephalic and atraumatic.      Mouth/Throat:      Mouth: Mucous membranes are moist.     Eyes:      Conjunctiva/sclera: Conjunctivae normal.     Neck:      Comments: Mild tenderness over paraspinal region of neck, bilat at level of C4-6  Cardiovascular:      Rate and Rhythm: Normal rate and regular rhythm.      Heart sounds: No murmur heard.  Pulmonary:      Effort: Pulmonary effort is normal. No respiratory distress.      Breath sounds: Normal breath sounds.   Abdominal:      General: There is no distension.      Palpations: Abdomen is soft.      Tenderness: There is no abdominal tenderness.     Musculoskeletal:         General: No swelling.      Right shoulder: Normal.      Left shoulder: Normal.      Right upper arm: Normal.      Left upper arm: Normal.      Right elbow: Normal.       Left elbow: Normal.      Right forearm: Normal.      Left forearm: Normal.      Right wrist: Normal.      Left wrist: Normal.      Right hand: Normal.      Left hand: Normal.      Cervical back: Neck supple. Tenderness present.      Thoracic back: Normal.      Lumbar back: Normal.      Comments: RLE: No tenderness over the lateral right hip.  Patient reports pain in the area medially.  Mild right knee pain baseline.  No swelling, ecchymosis or tenderness.    LLE: Existing left foot drop.  No tenderness.  Mild right knee pain baseline.  No swelling, ecchymosis or tenderness.     Skin:     General: Skin is warm and dry.      Capillary Refill: Capillary refill takes less than 2 seconds.     Neurological:      Mental Status: She is alert.     Psychiatric:         Mood and Affect: Mood normal.             Cervical spine cleared by clinical criteria? No (imaging required)      Invasive Devices       None                   Lab Results:   Results Reviewed       Procedure Component Value Units Date/Time    Comprehensive metabolic panel [[id number]]  (Abnormal) Collected: 06/14/25 1035    Lab Status: Final result Specimen: Blood from Arm, Right Updated: 06/14/25 1107     Sodium 140 mmol/L      Potassium 3.8 mmol/L      Chloride 107 mmol/L      CO2 26 mmol/L      ANION GAP 7 mmol/L      BUN 26 mg/dL      Creatinine 0.81 mg/dL      Glucose 99 mg/dL      Calcium 9.4 mg/dL      AST 25 U/L      ALT 33 U/L      Alkaline Phosphatase 56 U/L      Total Protein 6.6 g/dL      Albumin 4.1 g/dL      Total Bilirubin 0.54 mg/dL      eGFR 68 ml/min/1.73sq m     Narrative:      [employer] Kid[employer] guidelines for Chronic Kidney Disease (CKD):     Stage 1 with normal or high GFR (GFR > 90 mL/min/1.73 square meters)    Stage 2 Mild CKD (GFR = 60-89 mL/min/1.73 square meters)    Stage 3A Moderate CKD (GFR = 45-59 mL/min/1.73 square meters)    Stage 3B Moderate CKD (GFR = 30-44 mL/min/1.73 square meters)    Stage 4 Severe CKD  (GFR = 15-29 mL/min/1.73 square meters)    Stage 5 End Stage CKD (GFR <15 mL/min/1.73 square meters)  Note: GFR calculation is accurate only with a steady state creatinine    Protime-INR [[id number]]  (Normal) Collected: 06/14/25 1035    Lab Status: Final result Specimen: Blood from Arm, Right Updated: 06/14/25 1104     Protime 13.6 seconds      INR 1.00    Narrative:      INR Therapeutic Range    Indication                                             INR Range      Atrial Fibrillation                                               2.0-3.0  Hypercoagulable State                                    2.0.2.3  Left Ventricular Asist Device                            2.0-3.0  Mechanical Heart Valve                                  -    Aortic(with afib, MI, embolism, HF, LA enlargement,    and/or coagulopathy)                                     2.0-3.0 (2.5-3.5)     Mitral                                                             2.5-3.5  Prosthetic/Bioprosthetic Heart Valve               2.0-3.0  Venous thromboembolism (VTE: VT, PE        2.0-3.0    APTT [[id number]]  (Normal) Collected: 06/14/25 1035    Lab Status: Final result Specimen: Blood from Arm, Right Updated: 06/14/25 1104     PTT 30 seconds     CBC and differential [[id number]] Collected: 06/14/25 1035    Lab Status: Final result Specimen: Blood from Arm, Right Updated: 06/14/25 1045     WBC 4.56 Thousand/uL      RBC 4.78 Million/uL      Hemoglobin 13.5 g/dL      Hematocrit 41.6 %      MCV 87 fL      MCH 28.2 pg      MCHC 32.5 g/dL      RDW 14.6 %      MPV 10.2 fL      Platelets 204 Thousands/uL      nRBC 0 /100 WBCs      Segmented % 62 %      Immature Grans % 0 %      Lymphocytes % 28 %      Monocytes % 7 %      Eosinophils Relative 2 %      Basophils Relative 1 %      Absolute Neutrophils 2.87 Thousands/µL      Absolute Immature Grans 0.01 Thousand/uL      Absolute Lymphocytes 1.26 Thousands/µL      Absolute Monocytes 0.30 Thousand/µL      Eosinophils  Absolute 0.09 Thousand/µL      Basophils Absolute 0.03 Thousands/µL                    Imaging Studies:   Direct to CT: Yes  TRAUMA - CT head wo contrast   Final Result by Otis Forrester DO (06/14 1057)      No acute intracranial abnormality. Chronic microangiopathic changes. Chronic infarcts are noted.                  Workstation performed: [id number]         TRAUMA - CT spine cervical wo contrast   Final Result by Otis Forrester DO (06/14 1059)      No cervical spine fracture or traumatic malalignment. Mild degenerative changes                  Workstation performed: [id number]         XR Trauma chest portable   Final Result by Otis Forrester DO (06/14 1101)      No acute cardiopulmonary disease.            Workstation performed: [id number]         XR Trauma pelvis ap only 1 or 2 vw   Final Result by Otis Forrester DO (06/14 1102)      No acute osseous abnormality.         Computerized Assisted Algorithm (CAA) may have been used to analyze all applicable images.         Workstation performed: [id number]               Procedures  Procedures         ED Course  ED Course as of 06/16/25 0835   Sat Jun 14, 2025   1228 CBC and differential   1228 Comprehensive metabolic panel(!)   1228 Protime-INR  Labs unremarkable     1228 XR Trauma pelvis ap only 1 or 2 vw   1228 XR Trauma chest portable   1228 TRAUMA - CT spine cervical wo contrast   1228 TRAUMA - CT head wo contrast   1228 Imaging report reviewed.  No acute findings.   1228 Discussed with the patient about returning for any acute problems.  Otherwise can continue with Tylenol.  Patient was advised to restart her Plavix today.         Medical Decision Making  Impression is right-sided headache after fall yesterday.  Patient is on Plavix.  Last dose of Plavix was yesterday morning.  Patient also reporting mild neck pain and minimal right hip pain (baseline but slightly worse).  Patient is still walking and walked into the ER.    Initial  plan:  Trauma evaluation called.  Primary survey normal.   Workup for secondary survey findings to include CT head, cervical spine.  X-ray of the chest and pelvis.  Trauma labs    Amount and/or Complexity of Data Reviewed  Labs: ordered. Decision-making details documented in ED Course.  Radiology: ordered. Decision-making details documented in ED Course.          ER Course: Patient's headache improved in the ER.  Workup was essentially negative.  I discussed with the patient about following up as needed.        Cervical Collar Clearance:    The patient had a CT scan of the cervical spine demonstrating no acute injury. On exam, the patient had no midline point tenderness or paresthesias/numbness/weakness in the extremities. The patient had full range of motion (was then able to flex, extend, and rotate head laterally) without pain. There were no distracting injuries and the patient was not intoxicated.      The patient's cervical spine was cleared radiologically and clinically. Cervical collar removed.       Disposition  [facility] Transfer: Yes  Final diagnoses:   Fall, initial encounter   Injury of head, initial encounter     Time reflects when diagnosis was documented in both MDM as applicable and the Disposition within this note       Time User Action Codes Description Comment    6/14/2025 12:38 PM Meng Truong Add [W19.XXXA] Fall, initial encounter     6/14/2025 12:38 PM Meng Truong Add [S09.90XA] Injury of head, initial encounter           ED Disposition       ED Disposition   Discharge    Condition   Stable    Date/Time   Sat Jun 14, 2025 12:38 PM    Comment   Allison Salazar discharge to home/self care.                   Follow-up Information       Follow up With Specialties Details Why Contact Day[facility]    [facility]  Schedule an appointment as soon as possible for a visit   [address] [place] [address] [id number] [phone]          There are no discharge medications for this  patient.    No discharge procedures on file.    PDMP Review       None          Final Impression and Plan     1. Fall, initial encounter     -C-spine imaging neg. C-collar cleared.   -Hip pain chronic, no fx on x-ray      2. Injury of head, initial encounter     -Head ache resolved in ER   -CT Head negative. Pt to resume Plavix  -Okay for dc        ED Provider  Electronically Signed by             [1]   Past Medical History:  Diagnosis Date    Bladder cancer (HCC)     Breast cancer (HCC)     Stroke (HCC)    [2] No family history on file.  [3]   Past Surgical History:  Procedure Laterality Date    BREAST SURGERY      JOINT REPLACEMENT     [4]   Social History  Tobacco Use    Smoking status: Former     Types: Cigarettes    Smokeless tobacco: Never   Substance Use Topics    Alcohol use: Not Currently   [5]   Allergies  Allergen Reactions    Coffea Arabica Diarrhea    Gluten Meal - Food Allergy Diarrhea    Milk (Cow) Diarrhea    Pollen Extract Allergic Rhinitis    Trospium Other (See Comments) and Palpitations     dizziness        eMng MCNULTY MD  06/16/25 2558